# Patient Record
Sex: FEMALE | Race: WHITE | Employment: FULL TIME | URBAN - METROPOLITAN AREA
[De-identification: names, ages, dates, MRNs, and addresses within clinical notes are randomized per-mention and may not be internally consistent; named-entity substitution may affect disease eponyms.]

---

## 2018-10-19 ENCOUNTER — HOSPITAL ENCOUNTER (OUTPATIENT)
Age: 23
Discharge: HOME | End: 2018-10-19
Attending: FAMILY MEDICINE
Payer: COMMERCIAL

## 2018-10-19 VITALS
OXYGEN SATURATION: 100 % | SYSTOLIC BLOOD PRESSURE: 136 MMHG | HEART RATE: 99 BPM | TEMPERATURE: 98 F | DIASTOLIC BLOOD PRESSURE: 81 MMHG | RESPIRATION RATE: 18 BRPM | WEIGHT: 143 LBS | BODY MASS INDEX: 21.18 KG/M2 | HEIGHT: 69 IN

## 2018-10-19 DIAGNOSIS — R30.0 DYSURIA: Primary | ICD-10-CM

## 2018-10-19 PROCEDURE — 87086 URINE CULTURE/COLONY COUNT: CPT | Performed by: FAMILY MEDICINE

## 2018-10-19 PROCEDURE — 81025 URINE PREGNANCY TEST: CPT

## 2018-10-19 PROCEDURE — 99204 OFFICE O/P NEW MOD 45 MIN: CPT

## 2018-10-19 PROCEDURE — 81003 URINALYSIS AUTO W/O SCOPE: CPT

## 2018-10-19 RX ORDER — SULFAMETHOXAZOLE AND TRIMETHOPRIM 800; 160 MG/1; MG/1
1 TABLET ORAL 2 TIMES DAILY
Qty: 14 TABLET | Refills: 0 | Status: SHIPPED | OUTPATIENT
Start: 2018-10-19 | End: 2018-10-26

## 2018-10-19 NOTE — ED PROVIDER NOTES
Pt seen in [address]      Stated Complaint: Patient presents with:  Urinary Symptoms (urologic)      --------------   RN assessment:     Frequency, urgency, dysuria  --------------    CC: frequency, dysuria    HPI:  Marie Restrepo palpitations  Gastrointestinal: negative for  jaundice and odynophagia  Genitourinary:negative for decreased stream and nocturia  Behavioral/Psych: negative for aggressive behavior and hallucinations  10 point review of systems is otherwise negative.     Ob pending    ------------------------------------------------  MDM:  Differential diagnosis(es) d/w: uti, other  U/a done: Rx abtc's given (bactrim, HOLD: start if sx's \"no doubt,\" or told by us re: +Cx), azo otc prn  Therapeutic plan as noted  All questio

## 2018-10-19 NOTE — ED INITIAL ASSESSMENT (HPI)
Patient states having burning on urination, increased urgency and frequency in urination. Patient denies vaginal discharge or odor. Patient denies fever, denies abdominal pain, n/v, denies lower back pain.

## 2018-10-20 ENCOUNTER — HOSPITAL ENCOUNTER (OUTPATIENT)
Age: 23
Discharge: HOME | End: 2018-10-20
Attending: FAMILY MEDICINE
Payer: COMMERCIAL